# Patient Record
Sex: MALE | Race: WHITE
[De-identification: names, ages, dates, MRNs, and addresses within clinical notes are randomized per-mention and may not be internally consistent; named-entity substitution may affect disease eponyms.]

---

## 2018-02-17 ENCOUNTER — HOSPITAL ENCOUNTER (EMERGENCY)
Dept: HOSPITAL 17 - NEPD | Age: 43
Discharge: HOME | End: 2018-02-17
Payer: COMMERCIAL

## 2018-02-17 VITALS
TEMPERATURE: 99 F | DIASTOLIC BLOOD PRESSURE: 112 MMHG | HEART RATE: 97 BPM | RESPIRATION RATE: 18 BRPM | OXYGEN SATURATION: 96 % | SYSTOLIC BLOOD PRESSURE: 203 MMHG

## 2018-02-17 VITALS
HEART RATE: 97 BPM | TEMPERATURE: 97.6 F | RESPIRATION RATE: 18 BRPM | OXYGEN SATURATION: 96 % | DIASTOLIC BLOOD PRESSURE: 112 MMHG | SYSTOLIC BLOOD PRESSURE: 203 MMHG

## 2018-02-17 VITALS — WEIGHT: 291.01 LBS | BODY MASS INDEX: 33.67 KG/M2 | HEIGHT: 78 IN

## 2018-02-17 DIAGNOSIS — I48.91: ICD-10-CM

## 2018-02-17 DIAGNOSIS — S50.02XA: Primary | ICD-10-CM
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
## 2018-02-17 DIAGNOSIS — Z79.899: ICD-10-CM

## 2018-02-17 DIAGNOSIS — E78.00: ICD-10-CM

## 2018-02-17 DIAGNOSIS — Z79.82: ICD-10-CM

## 2018-02-17 DIAGNOSIS — I10: ICD-10-CM
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
## 2018-02-17 PROCEDURE — 73080 X-RAY EXAM OF ELBOW: CPT

## 2018-02-17 PROCEDURE — 99283 EMERGENCY DEPT VISIT LOW MDM: CPT

## 2018-02-17 NOTE — PD
HPI


Chief Complaint:  Injury


Time Seen by Provider:  03:02


Travel History


International Travel<30 days:  No


Contact w/Intl Traveler<30days:  No


Traveled to known affect area:  No





History of Present Illness


HPI


42-year-old right-hand dominant white male  presents emergency 

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 

inmate.  The patient states that he is not sure of how exactly he had injured 

himself.  He states that review of the tape indicates that he had struck his 

elbow on the floor.  He denies any numbness, tingling or focal weakness.  He 

complains of moderate pain worse with any extends or flexes his arm.  No injury 

to his hand, or shoulder.





PFSH


Past Medical History


*** Narrative Medical


Hypercholesterolemia, A. fib, hypertension


Arthritis:  No


Asthma:  No


Atrial Fibrillation:  Yes


Blood Disorders:  No


Heart Rhythm Problems:  Yes


Cancer:  No


Cardiovascular Problems:  Yes


High Cholesterol:  Yes


Chemotherapy:  No


Chest Pain:  Yes


Congestive Heart Failure:  No


COPD:  No


Cerebrovascular Accident:  No


Diminished Hearing:  No


Endocrine:  No


Gastrointestinal Disorders:  Yes


GERD:  No


Genitourinary:  No


Headaches:  Yes


Hepatitis:  No


Hiatal Hernia:  No


Hypertension:  Yes


Immune Disorder:  No


Kidney Stones:  No


Musculoskeletal:  Yes


Neurologic:  Yes


Psychiatric:  No


Reproductive:  No


Respiratory:  No


Immunizations Current:  Yes


Migraines:  Yes


Myocardial Infarction:  No


Radiation Therapy:  No


Renal Failure:  No


Seizures:  No


Sleep Apnea:  No


Ulcer:  No


Tetanus Vaccination:  > 5 Years


Influenza Vaccination:  No





Past Surgical History


*** Narrative Surgical


Right knee arthroscopy.


Abdominal Surgery:  No


Appendectomy:  No


Cardiac Surgery:  No


Cholecystectomy:  No


Ear Surgery:  No


Endocrine Surgery:  No


Eye Surgery:  No


Genitourinary Surgery:  No


Gynecologic Surgery:  No


Oral Surgery:  No


Thoracic Surgery:  No





Social History


Alcohol Use:  Yes ("OCCASIONALLY")


Tobacco Use:  No


Substance Use:  No





Allergies-Medications


(Allergen,Severity, Reaction):  


Coded Allergies:  


     No Known Allergies (Verified  Adverse Reaction, Unknown, 2/17/18)


Reported Meds & Prescriptions





Reported Meds & Active Scripts


Active


Bentyl (Dicyclomine HCl) 20 Mg Tab 20 Mg PO Q8 


Carafate (Sucralfate) 1 Gm Tab 1 Gm PO TIDACHS 


     Take with water on an empty stomach. To reduce the potential of


     adversely affecting the absorption of other drugs, take other drugs 2


     hours prior to Sucralfate.


Protonix (Pantoprazole Sodium) 20 Mg Tab 20 Mg PO DAILY 


Reported


Multaq (Dronedarone) 400 Mg Tab 400 Mg OR  


Metoprolol Tartrate 25 Mg Tab 25 Mg PO  


Cardizem La (Diltiazem HCl) 240 Mg Tab 240 Mg PO  


Aspirin 325 Mg Tab 325 Mg PO DAILYPRN 








Review of Systems


Except as stated in HPI:  all other systems reviewed are Neg





Physical Exam


Narrative


GENERAL: This is a well-nourished, well-developed patient, in no apparent 

distress.


SKIN: No rashes, ecchymoses or lesions. Warm and dry.


HEAD: Atraumatic. Normocephalic.


EYES: PERRL, EOMI, no discharge or injection. No scleral icterus.


EARS: Clear


NOSE: Nasal turbinates appear normal.


THROAT: Mucosa pink and moist.  Airway patent.


NECK: Trachea midline. supple, moves head freely.  No bony tenderness.


LUNGS: Clear to auscultation.


CV: Regular in rhythm.


ABDOMEN: Soft nontender.


Back: No bony tenderness.  Moves freely.


EXT: No clubbing cyanosis or edema.  Examination of left upper extremity 

reveals tenderness and swelling over the olecranon.  There is no pain over the 

medial lateral compartments of the elbow.  The skin is intact.  No pain in the 

hand, wrist, shoulder or clavicle.  He has full range of motion but has pain at 

end range.  The right upper extremity as well as lower extremities are without 

localizing bony tenderness or deformity.  Neurovascular intact.





Data


Data


Last Documented VS





Vital Signs








  Date Time  Temp Pulse Resp B/P (MAP) Pulse Ox O2 Delivery O2 Flow Rate FiO2


 


2/17/18 02:53 99.0 97 18 203/112 (142) 96   








Orders





 Orders


Elbow, Complete (4 Vws) (2/17/18 03:09)


Ice/Cold Pack (2/17/18 03:09)


Ed Discharge Order (2/17/18 03:58)


Naproxen (Naprosyn) (2/17/18 04:00)








MDM


Medical Decision Making


Medical Screen Exam Complete:  Yes


Emergency Medical Condition:  Yes


Medical Record Reviewed:  Yes


Interpretation(s)





Last 24 hours Impressions








Elbow X-Ray 2/17/18 0307 Signed





Impressions: 





 Service Date/Time:  Saturday, February 17, 2018 03:24 - CONCLUSION:  





 Calcifications possibly within the triceps tendon chronic in nature without 





 acute fracture.      KGage Ruddy Shamlou, MD 








Differential Diagnosis


MDM: High


Differential diagnoses: Fracture, sprain, strain, dislocation, contusion, 

neurovascular injury


Narrative Course


X-ray of the left elbow is negative for bony injury.  Patient has some 

calcifications chronically.  Patient is given Naprosyn 500 mg p.o.





This is left elbow contusion





Diagnosis





 Primary Impression:  


 Left elbow contusion


 Qualified Codes:  S50.02XA - Contusion of left elbow, initial encounter


Patient Instructions:  General Instructions





***Additional Instructions:  


Rest.


Elevation.


Ice.


Diclofenac.


Follow-up with work comp doctor in 1 week.


***Med/Other Pt SpecificInfo:  Prescription(s) given


Scripts


Diclofenac Sodium DR (Diclofenac Sodium DR) 75 Mg Tabdr


75 MG PO BID, #30 TAB 0 Refills


   Prov: Paul Elder MD         2/17/18


Disposition:  01 DISCHARGE HOME


Condition:  Stable











Anjel Aguilar Feb 17, 2018 03:24

## 2018-02-17 NOTE — RADRPT
EXAM DATE/TIME:  02/17/2018 03:24 

 

[facility] COMPARISON:     

No previous studies available for comparison.

 

                     

INDICATIONS :     

Patient fell onto lateral elbow while at work.

                     

 

MEDICAL HISTORY :     

None.          

 

SURGICAL HISTORY :     

None.   

 

ENCOUNTER:     

Initial                                        

 

ACUITY:     

1 day      

 

PAIN SCORE:     

7/10

 

LOCATION:     

Left  Elbow

 

FINDINGS:     

Multiple areas of calcifications are present adjacent to the olecranon may be within the triceps tend
on chronic in nature. Acute fracture is not seen.

 

CONCLUSION:     

Calcifications possibly within the triceps tendon chronic in nature without acute fracture.

 

 

 

 

 BRAYAN Gerard MD on February 17, 2018 at 3:40           

Board Certified Radiologist.

 This report was verified electronically.